# Patient Record
Sex: FEMALE | Race: WHITE | NOT HISPANIC OR LATINO | URBAN - NONMETROPOLITAN AREA
[De-identification: names, ages, dates, MRNs, and addresses within clinical notes are randomized per-mention and may not be internally consistent; named-entity substitution may affect disease eponyms.]

---

## 2018-03-02 PROBLEM — Z96.1: Noted: 2018-03-02

## 2018-03-02 PROBLEM — H40.1134: Noted: 2018-03-02

## 2018-03-02 PROBLEM — E11.9: Noted: 2018-03-02

## 2019-01-31 ENCOUNTER — IMPORTED ENCOUNTER (OUTPATIENT)
Dept: URBAN - NONMETROPOLITAN AREA CLINIC 1 | Facility: CLINIC | Age: 64
End: 2019-01-31

## 2019-01-31 PROCEDURE — 99213 OFFICE O/P EST LOW 20 MIN: CPT

## 2019-01-31 PROCEDURE — 92133 CPTRZD OPH DX IMG PST SGM ON: CPT

## 2019-01-31 NOTE — PATIENT DISCUSSION
Pseudophakia OU - Discussed diagnosis in detail with patient - S/P Yag PC OS- Patient is stable and doing well- Continue to monitorPOAG OU - Discussed diagnosis in detail with patient- IOP today 15 OU- Cup to Disc noted at .2 OU- Continue Cosopt PF BID OU Rx sent to pharmacy today- VF done previously shows Non specific defects OU  - Optos done previously shows RPE changes and stable OU at this time - OCT done today OD shows Inferior NFL thinning and OS shows Borderline inferior Nasal NFL thinning - Continue to monitorNIDDM (Since 2005)- Discussed diagnosis in detail with patient- Stressed importance of good blood sugar control- Recommend no soda’s- Patient reports blood sugars was 99 and  is unsure of last A1C - No diabetic retinopathy seen on todays exam- Letter to [facility]- Continue to monitor; 's Notes: VF 5/24/18Optos 9/27/18OCT 1/31/19

## 2019-05-09 ENCOUNTER — IMPORTED ENCOUNTER (OUTPATIENT)
Dept: URBAN - NONMETROPOLITAN AREA CLINIC 1 | Facility: CLINIC | Age: 64
End: 2019-05-09

## 2019-05-09 PROCEDURE — 99213 OFFICE O/P EST LOW 20 MIN: CPT

## 2019-05-09 PROCEDURE — 92083 EXTENDED VISUAL FIELD XM: CPT

## 2019-05-09 NOTE — PATIENT DISCUSSION
Pseudophakia OU - Discussed diagnosis in detail with patient - S/P Yag PC OS- Good central opening - Patient is stable and doing well- Continue to monitorPOAG OU - Discussed diagnosis in detail with patient- IOP today 13 OU- Cup to Disc noted at .2 OU- Continue Cosopt PF BID OU Rx sent to pharmacy today- VF done today OD shows fair field with  Non specific defects and OS shows Good field and normal  - Optos done previously shows RPE changes and stable OU at this time - OCT done previously OD shows Inferior NFL thinning and OS shows Borderline inferior Nasal NFL thinning - Continue to monitorNIDDM (Since 2005)- Discussed diagnosis in detail with patient- Stressed importance of good blood sugar control- Recommend no soda’s- Patient reports blood sugars was 121 and last A1C was 5.7- No diabetic retinopathy seen on todays exam- Letter to [facility]- Continue to monitor; 's Notes: VF 5/9/19Optos 9/27/18OCT 1/31/19

## 2019-09-10 ENCOUNTER — IMPORTED ENCOUNTER (OUTPATIENT)
Dept: URBAN - NONMETROPOLITAN AREA CLINIC 1 | Facility: CLINIC | Age: 64
End: 2019-09-10

## 2019-09-10 PROCEDURE — 92133 CPTRZD OPH DX IMG PST SGM ON: CPT

## 2019-09-10 PROCEDURE — 92014 COMPRE OPH EXAM EST PT 1/>: CPT

## 2019-09-10 NOTE — PATIENT DISCUSSION
POAG OU - Discussed diagnosis in detail with patient- IOP elevated today 16 OU- Cup to Disc noted at .2 OU- Continue Cosopt PF BID OU Rx sent to pharmacy today. Discussed today the possibilty of adding another gtts in the future if more progression appears. - VF done previously OD shows fair field with  Non specific defects and OS shows Good field and normal  - Optos done previously shows RPE changes and stable OU at this time - OCT done today  OD shows Inferior NFL thinning and OS shows Borderline inferior Nasal NFL thinning.  Mild Progression OU noted on todays exam. - Continue to monitorPseudophakia OU - Discussed diagnosis in detail with patient - S/P Yag PC OS- Good central opening - Patient is stable and doing well- Continue to monitorNIDDM (Since 2005)- Discussed diagnosis in detail with patient- Stressed importance of good blood sugar control- Recommend no soda’s- Patient reports blood sugars was 121 and last A1C was 5.9- No diabetic retinopathy seen on todays exam- Letter to [facility]- Continue to monitor; Dr's Notes: VF 5/9/19Optos 9/27/18OCT 9/10/19

## 2020-01-07 ENCOUNTER — IMPORTED ENCOUNTER (OUTPATIENT)
Dept: URBAN - NONMETROPOLITAN AREA CLINIC 1 | Facility: CLINIC | Age: 65
End: 2020-01-07

## 2020-01-07 PROCEDURE — 92250 FUNDUS PHOTOGRAPHY W/I&R: CPT

## 2020-01-07 PROCEDURE — 99213 OFFICE O/P EST LOW 20 MIN: CPT

## 2020-01-07 NOTE — PATIENT DISCUSSION
POAG OU - Discussed diagnosis in detail with patient- IOP elevated today 16 OU- Cup to Disc noted at .2 OU- Continue Cosopt PF BID OU Rx sent to pharmacy today. Discussed today the possibilty of adding another gtts in the future if more progression appears. - VF done previously OD shows fair field with  Non specific defects and OS shows Good field and normal  - Optos done today shows RPE changes and stable OU at this time - OCT done previously OD shows Inferior NFL thinning and OS shows Borderline inferior Nasal NFL thinning.  Mild Progression OU noted on todays exam. - Continue to monitorPseudophakia OU - Discussed diagnosis in detail with patient - S/P Yag PC OS- Good central opening - Patient is stable and doing well- Continue to monitorNIDDM (Since 2005)- Discussed diagnosis in detail with patient- Stressed importance of good blood sugar control- Recommend no soda’s- Patient reports blood sugars was 121 and last A1C was 5.9- No diabetic retinopathy seen on todays exam- Letter to [facility]- Continue to monitor; Dr's Notes: VF 5/9/19Optos 1/7/20OCT 9/10/19

## 2020-05-07 ENCOUNTER — IMPORTED ENCOUNTER (OUTPATIENT)
Dept: URBAN - NONMETROPOLITAN AREA CLINIC 1 | Facility: CLINIC | Age: 65
End: 2020-05-07

## 2020-05-07 PROCEDURE — 99213 OFFICE O/P EST LOW 20 MIN: CPT

## 2020-05-07 NOTE — PATIENT DISCUSSION
POAG OU - Discussed diagnosis in detail with patient- IOP elevated today 16 OU- Cup to Disc noted at .2 OU- Continue Cosopt PF BID OU Rx sent to pharmacy today. Discussed today the possibilty of adding another gtts in the future if more progression appears. - VF done previously OD shows fair field with  Non specific defects and OS shows Good field and normal  - Optos done previously shows RPE changes and stable OU at this time - OCT done previously OD shows Inferior NFL thinning and OS shows Borderline inferior Nasal NFL thinning.  Mild Progression OU noted on todays exam. - Continue to monitor- RTC in 4 months with VF 24-2Pseudophakia OU - Discussed diagnosis in detail with patient - S/P Yag PC OS- Good central opening - Patient is stable and doing well- Continue to monitorNIDDM (Since 2005)- Discussed diagnosis in detail with patient- Stressed importance of good blood sugar control- Recommend no soda’s- Patient reports last A1C was 5.7- No diabetic retinopathy seen on todays exam- Letter to [facility]- Continue to monitor; 's Notes: VF 5/9/19Optos 1/7/20OCT 9/10/19

## 2020-09-10 ENCOUNTER — IMPORTED ENCOUNTER (OUTPATIENT)
Dept: URBAN - NONMETROPOLITAN AREA CLINIC 1 | Facility: CLINIC | Age: 65
End: 2020-09-10

## 2020-09-10 PROCEDURE — 99214 OFFICE O/P EST MOD 30 MIN: CPT

## 2020-09-10 PROCEDURE — 92133 CPTRZD OPH DX IMG PST SGM ON: CPT

## 2020-09-10 NOTE — PATIENT DISCUSSION
POAG OU - Discussed diagnosis in detail with patient- IOP elevated today 14 OU- Cup to Disc noted at .2 OU- Continue Cosopt PF BID OU Rx sent to pharmacy today.  -  VF done previously OD shows fair field with  Non specific defects and OS shows Good field and normal  - Optos done previously shows RPE changes and stable OU at this time - OCT done today OD shows Inferior NFL thinning and Borderline Temporal NFL thinning and OS shows Borderline Suoerior and Inferior NFL thinning  - Continue to monitor- RTC in 4 months with F/U with VF 24-2Pseudophakia OU - Discussed diagnosis in detail with patient - S/P Yag PC OS- Good central opening - Continue to monitorNIDDM (Since 2005)- Discussed diagnosis in detail with patient- Stressed importance of good blood sugar control- Recommend no soda’s- Patient reports last A1C was 5.7- No diabetic retinopathy seen on todays exam- Letter to [facility]- Continue to monitor; Dr's Notes: VF 5/9/19Optos 1/7/20OCT 9/10/20

## 2021-01-12 ENCOUNTER — IMPORTED ENCOUNTER (OUTPATIENT)
Dept: URBAN - NONMETROPOLITAN AREA CLINIC 1 | Facility: CLINIC | Age: 66
End: 2021-01-12

## 2021-01-12 PROCEDURE — 92083 EXTENDED VISUAL FIELD XM: CPT

## 2021-01-12 PROCEDURE — 92014 COMPRE OPH EXAM EST PT 1/>: CPT

## 2021-01-12 NOTE — PATIENT DISCUSSION
POAG OU - Discussed diagnosis in detail with patient- IOP elevated today 14 OU- Cup to Disc noted at .2 OU- Continue Cosopt PF BID OU Rx sent to pharmacy today.  -  VF done OU shows Good fields and Normal   - Optos done previously shows RPE changes and stable OU at this time - OCT done previously OD shows Inferior NFL thinning and Borderline Temporal NFL thinning and OS shows Borderline Suoerior and Inferior NFL thinning  - Continue to monitor- RTC in 6 months with F/U with OCT and MR Pseudophakia OU - Discussed diagnosis in detail with patient - S/P Yag PC OS- Good central opening - Continue to monitorNIDDM (Since 2005)- Discussed diagnosis in detail with patient- Stressed importance of good blood sugar control- Recommend no soda’s- Patient reports last A1C was 5.7- No diabetic retinopathy seen on todays exam- Letter to [facility]- Continue to monitor; Dr's Notes: VF 5/9/19Optos 1/7/20OCT 9/10/20

## 2021-07-15 ENCOUNTER — PREPPED CHART (OUTPATIENT)
Dept: URBAN - NONMETROPOLITAN AREA CLINIC 1 | Facility: CLINIC | Age: 66
End: 2021-07-15

## 2021-07-15 ENCOUNTER — IMPORTED ENCOUNTER (OUTPATIENT)
Dept: URBAN - NONMETROPOLITAN AREA CLINIC 1 | Facility: CLINIC | Age: 66
End: 2021-07-15

## 2021-07-15 PROBLEM — Z96.1: Noted: 2018-03-02

## 2021-07-15 PROBLEM — H40.1134: Noted: 2018-03-02

## 2021-07-15 PROBLEM — H52.4: Noted: 2021-07-15

## 2021-07-15 PROBLEM — E11.9: Noted: 2018-03-02

## 2021-07-15 PROCEDURE — 99213 OFFICE O/P EST LOW 20 MIN: CPT

## 2021-07-15 PROCEDURE — 92015 DETERMINE REFRACTIVE STATE: CPT

## 2021-07-15 PROCEDURE — 92133 CPTRZD OPH DX IMG PST SGM ON: CPT

## 2021-07-15 NOTE — PATIENT DISCUSSION
POAG OU - Discussed diagnosis in detail with patient- IOP elevated today 14 OU- Cup to Disc noted at .2 OU- Continue Cosopt PF BID OU Rx sent to pharmacy today.  -  VF done previouslyOU shows Good fields and Normal   - Optos done previously shows RPE changes and stable OU at this time - OCT done today OD shows Inferior NFL thinning and OS shows Borderline Superior Inferior and Nasal NFL thinning -Continue to monitor- RTC in 6 months with F/U with VF Pseudophakia OU - Discussed diagnosis in detail with patient - S/P Yag PC OS- Good central opening - Continue to monitorNIDDM (Since 2005)- Discussed diagnosis in detail with patient- Stressed importance of good blood sugar control- Recommend no soda’s- Patient reports last A1C was 5.7- No diabetic retinopathy seen on todays exam- Letter to [facility]- Continue to monitorPresbyopia OU - Discussed diagnosis in detail with patient - New glasses RX given - Continue to monitor; Dr's Notes: MR 7/15/21VF 5/9/19Optos 1/7/20OCT 7/15/21

## 2022-03-11 ENCOUNTER — FOLLOW UP (OUTPATIENT)
Dept: URBAN - NONMETROPOLITAN AREA CLINIC 1 | Facility: CLINIC | Age: 67
End: 2022-03-11

## 2022-03-11 DIAGNOSIS — H40.1131: ICD-10-CM

## 2022-03-11 PROCEDURE — 92083 EXTENDED VISUAL FIELD XM: CPT

## 2022-03-11 PROCEDURE — 99214 OFFICE O/P EST MOD 30 MIN: CPT

## 2022-03-11 ASSESSMENT — PACHYMETRY
OD_CT_UM: 587
OS_CT_UM: 590

## 2022-03-11 ASSESSMENT — TONOMETRY
OS_IOP_MMHG: 14
OD_IOP_MMHG: 14
OD_IOP_MMHG: 19
OS_IOP_MMHG: 16

## 2022-03-11 ASSESSMENT — VISUAL ACUITY
OS_CC: 20/30
OD_CC: 20/25-1

## 2022-03-11 NOTE — PATIENT DISCUSSION
Discussed diagnosis in detail with patient. Appears stable on exam today. Continue current treatment regimen Cosoft PF BID OU. Continue to monitor every few months with testing as directed.

## 2022-04-09 ASSESSMENT — TONOMETRY
OS_IOP_MMHG: 16
OD_IOP_MMHG: 14
OS_IOP_MMHG: 16
OS_IOP_MMHG: 14
OD_IOP_MMHG: 16
OD_IOP_MMHG: 14
OS_IOP_MMHG: 16
OD_IOP_MMHG: 16
OD_IOP_MMHG: 14
OS_IOP_MMHG: 15
OD_IOP_MMHG: 13
OD_IOP_MMHG: 15
OS_IOP_MMHG: 13
OD_IOP_MMHG: 16

## 2022-04-09 ASSESSMENT — VISUAL ACUITY
OD_SC: 20/25
OS_SC: 20/20
OD_SC: 20/25
OS_SC: 20/25
OD_SC: 20/30+2
OS_SC: 20/20-
OS_SC: 20/20
OD_SC: 20/25
OD_SC: 20/20-2
OD_SC: 20/25-2
OD_SC: 20/30
OS_SC: 20/25
OD_SC: 20/25
OS_SC: 20/20

## 2022-04-09 ASSESSMENT — PACHYMETRY
OS_CT_UM: 590; ADJ: THICK
OD_CT_UM: 587; ADJ: THICK
OS_CT_UM: 590; ADJ: THICK
OD_CT_UM: 587; ADJ: THICK
OS_CT_UM: 590; ADJ: THICK
OS_CT_UM: 590; ADJ: THICK
OD_CT_UM: 587; ADJ: THICK
OS_CT_UM: 590; ADJ: THICK
OS_CT_UM: 590; ADJ: THICK
OD_CT_UM: 587; ADJ: THICK
OS_CT_UM: 590; ADJ: THICK
OS_CT_UM: 590; ADJ: THICK

## 2022-09-12 ENCOUNTER — FOLLOW UP (OUTPATIENT)
Dept: URBAN - NONMETROPOLITAN AREA CLINIC 1 | Facility: CLINIC | Age: 67
End: 2022-09-12

## 2022-09-12 DIAGNOSIS — H52.4: ICD-10-CM

## 2022-09-12 DIAGNOSIS — H40.1131: ICD-10-CM

## 2022-09-12 PROCEDURE — 99214 OFFICE O/P EST MOD 30 MIN: CPT

## 2022-09-12 PROCEDURE — 92015 DETERMINE REFRACTIVE STATE: CPT

## 2022-09-12 PROCEDURE — 92250 FUNDUS PHOTOGRAPHY W/I&R: CPT

## 2022-09-12 ASSESSMENT — VISUAL ACUITY
OD_CC: 20/22+1
OU_CC: 20/20
OS_CC: 20/20-2

## 2022-09-12 ASSESSMENT — TONOMETRY
OS_IOP_MMHG: 20
OD_IOP_MMHG: 19

## 2024-03-14 ENCOUNTER — FOLLOW UP (OUTPATIENT)
Dept: URBAN - NONMETROPOLITAN AREA CLINIC 1 | Facility: CLINIC | Age: 69
End: 2024-03-14

## 2024-03-14 DIAGNOSIS — H40.1131: ICD-10-CM

## 2024-03-14 DIAGNOSIS — E11.9: ICD-10-CM

## 2024-03-14 DIAGNOSIS — H52.4: ICD-10-CM

## 2024-03-14 PROCEDURE — 92133 CPTRZD OPH DX IMG PST SGM ON: CPT

## 2024-03-14 PROCEDURE — 92015 DETERMINE REFRACTIVE STATE: CPT

## 2024-03-14 PROCEDURE — 99213 OFFICE O/P EST LOW 20 MIN: CPT

## 2024-03-14 ASSESSMENT — VISUAL ACUITY
OS_CC: 20/30
OD_CC: 20/30+1

## 2024-03-14 ASSESSMENT — TONOMETRY
OS_IOP_MMHG: 18
OD_IOP_MMHG: 18

## 2024-09-16 ENCOUNTER — FOLLOW UP (OUTPATIENT)
Dept: URBAN - NONMETROPOLITAN AREA CLINIC 1 | Facility: CLINIC | Age: 69
End: 2024-09-16

## 2024-09-16 DIAGNOSIS — E11.9: ICD-10-CM

## 2024-09-16 DIAGNOSIS — H40.1131: ICD-10-CM

## 2024-09-16 PROCEDURE — 92083 EXTENDED VISUAL FIELD XM: CPT

## 2024-09-16 PROCEDURE — 99213 OFFICE O/P EST LOW 20 MIN: CPT

## 2024-09-16 PROCEDURE — 92250 FUNDUS PHOTOGRAPHY W/I&R: CPT

## 2025-03-18 ENCOUNTER — COMPREHENSIVE EXAM (OUTPATIENT)
Age: 70
End: 2025-03-18

## 2025-03-18 DIAGNOSIS — H40.1131: ICD-10-CM

## 2025-03-18 DIAGNOSIS — E11.9: ICD-10-CM

## 2025-03-18 DIAGNOSIS — H52.4: ICD-10-CM

## 2025-03-18 PROCEDURE — 92133 CPTRZD OPH DX IMG PST SGM ON: CPT

## 2025-03-18 PROCEDURE — 99214 OFFICE O/P EST MOD 30 MIN: CPT

## 2025-03-18 PROCEDURE — 92015 DETERMINE REFRACTIVE STATE: CPT

## 2025-03-18 RX ORDER — BRIMONIDINE TARTRATE 1.5 MG/ML: 1 SOLUTION/ DROPS OPHTHALMIC TWICE A DAY

## 2025-07-14 ENCOUNTER — EMERGENCY VISIT (OUTPATIENT)
Age: 70
End: 2025-07-14

## 2025-07-14 DIAGNOSIS — H16.203: ICD-10-CM

## 2025-07-14 DIAGNOSIS — H18.231: ICD-10-CM

## 2025-07-14 DIAGNOSIS — H40.1131: ICD-10-CM

## 2025-07-14 DIAGNOSIS — S05.02XA: ICD-10-CM

## 2025-07-14 PROCEDURE — 92250 FUNDUS PHOTOGRAPHY W/I&R: CPT

## 2025-07-14 PROCEDURE — 99213 OFFICE O/P EST LOW 20 MIN: CPT

## 2025-07-14 RX ORDER — TOBRAMYCIN AND DEXAMETHASONE 1; 3 MG/ML; MG/ML: 1 SUSPENSION/ DROPS OPHTHALMIC TWICE A DAY

## 2025-07-18 ENCOUNTER — FOLLOW UP (OUTPATIENT)
Age: 70
End: 2025-07-18

## 2025-07-18 DIAGNOSIS — H18.231: ICD-10-CM

## 2025-07-18 DIAGNOSIS — S05.02XD: ICD-10-CM

## 2025-07-18 PROCEDURE — 99213 OFFICE O/P EST LOW 20 MIN: CPT

## 2025-07-24 ENCOUNTER — FOLLOW UP (OUTPATIENT)
Age: 70
End: 2025-07-24

## 2025-07-24 DIAGNOSIS — H18.231: ICD-10-CM

## 2025-07-24 DIAGNOSIS — S05.02XD: ICD-10-CM

## 2025-07-24 PROCEDURE — 99213 OFFICE O/P EST LOW 20 MIN: CPT

## 2025-07-31 ENCOUNTER — FOLLOW UP (OUTPATIENT)
Age: 70
End: 2025-07-31

## 2025-07-31 DIAGNOSIS — S05.02XD: ICD-10-CM

## 2025-07-31 DIAGNOSIS — H18.231: ICD-10-CM

## 2025-07-31 PROCEDURE — 99213 OFFICE O/P EST LOW 20 MIN: CPT
